# Patient Record
Sex: FEMALE | Race: WHITE | NOT HISPANIC OR LATINO | Employment: FULL TIME | URBAN - METROPOLITAN AREA
[De-identification: names, ages, dates, MRNs, and addresses within clinical notes are randomized per-mention and may not be internally consistent; named-entity substitution may affect disease eponyms.]

---

## 2017-01-12 ENCOUNTER — APPOINTMENT (OUTPATIENT)
Dept: RADIOLOGY | Facility: IMAGING CENTER | Age: 32
End: 2017-01-12
Attending: PHYSICIAN ASSISTANT
Payer: COMMERCIAL

## 2017-01-12 ENCOUNTER — OFFICE VISIT (OUTPATIENT)
Dept: URGENT CARE | Facility: CLINIC | Age: 32
End: 2017-01-12
Payer: COMMERCIAL

## 2017-01-12 VITALS
OXYGEN SATURATION: 99 % | HEART RATE: 74 BPM | TEMPERATURE: 98.4 F | SYSTOLIC BLOOD PRESSURE: 110 MMHG | WEIGHT: 139 LBS | BODY MASS INDEX: 21.82 KG/M2 | HEIGHT: 67 IN | RESPIRATION RATE: 17 BRPM | DIASTOLIC BLOOD PRESSURE: 80 MMHG

## 2017-01-12 DIAGNOSIS — M79.672 LEFT FOOT PAIN: ICD-10-CM

## 2017-01-12 DIAGNOSIS — S93.412A SPRAIN OF CALCANEOFIBULAR LIGAMENT OF LEFT ANKLE, INITIAL ENCOUNTER: ICD-10-CM

## 2017-01-12 DIAGNOSIS — S82.832A OTHER CLOSED FRACTURE OF DISTAL END OF LEFT FIBULA, INITIAL ENCOUNTER: Primary | ICD-10-CM

## 2017-01-12 LAB
INT CON NEG: NORMAL
INT CON POS: NORMAL
POC URINE PREGNANCY TEST: NEGATIVE

## 2017-01-12 PROCEDURE — 73590 X-RAY EXAM OF LOWER LEG: CPT | Mod: TC | Performed by: PHYSICIAN ASSISTANT

## 2017-01-12 PROCEDURE — 99214 OFFICE O/P EST MOD 30 MIN: CPT | Performed by: PHYSICIAN ASSISTANT

## 2017-01-12 PROCEDURE — 73630 X-RAY EXAM OF FOOT: CPT | Mod: TC | Performed by: PHYSICIAN ASSISTANT

## 2017-01-12 PROCEDURE — 81025 URINE PREGNANCY TEST: CPT | Performed by: PHYSICIAN ASSISTANT

## 2017-01-12 PROCEDURE — 73610 X-RAY EXAM OF ANKLE: CPT | Mod: TC | Performed by: PHYSICIAN ASSISTANT

## 2017-01-12 RX ORDER — HYDROCODONE BITARTRATE AND ACETAMINOPHEN 5; 325 MG/1; MG/1
1-2 TABLET ORAL EVERY 4 HOURS PRN
Qty: 30 TAB | Refills: 0 | Status: SHIPPED | OUTPATIENT
Start: 2017-01-12 | End: 2017-01-19

## 2017-01-12 ASSESSMENT — ENCOUNTER SYMPTOMS
INABILITY TO BEAR WEIGHT: 1
TINGLING: 0
LOSS OF SENSATION: 0
MUSCLE WEAKNESS: 0
LOSS OF MOTION: 1
NUMBNESS: 0

## 2017-01-12 NOTE — MR AVS SNAPSHOT
"        Allie Savage Garrick   2017 1:40 PM   Office Visit   MRN: [id number]    Department:  [facility]   Dept Phone:  [phone]    Description:  Female : 1985   Provider:  Claudy Hernández PA-C           Reason for Visit     Ankle Injury working out slipped and rolled left ankle today, painful to apply pressure, hard to rotate, radiating up the leg      Allergies as of 2017     Allergen Noted Reactions    Iodine 2015   Rash    Rash     Mobic [Meloxicam] 2015   Anxiety, Unspecified    Per patient,  meloxican made her very mc[place]mott    Tape 2015   Rash    Paper tape okay      You were diagnosed with     Other closed fracture of distal end of left fibula, initial encounter   [[id number]]  -  Primary     Sprain of calcaneofibular ligament of left ankle, initial encounter   [[id number]]       Left foot pain   [[id number]]         Vital Signs     Blood Pressure Pulse Temperature Respirations Height Weight    110/80 mmHg 74 36.9 °C (98.4 °F) 17 1.702 m (5' 7\") 63.05 kg (139 lb)    Body Mass Index Oxygen Saturation Breastfeeding? Smoking Status          21.77 kg/m2 99% No Never Smoker         Basic Information     Date Of Birth Sex Race Ethnicity Preferred Language    1985 Female White Non- English      Problem List              ICD-10-CM Priority Class Noted - Resolved    Chronic right shoulder pain M25.511, G89.29   2014 - Present    History of abnormal cervical Pap smear Z87.898   2014 - Present    Dysplasia of cervix, low grade (ALLIE 1) N87.0   Unknown - Present    Hemorrhoids K64.9   2015 - Present    Leukocytosis D72.829   2015 - Present    Hyponatremia E87.1   2015 - Present    Normocytic anemia D64.9   2015 - Present    Lower abdominal pain R10.30   2015 - Present    Anal fissure K60.2   10/26/2015 - Present      Health Maintenance        Date Due Completion Dates    IMM DTaP/Tdap/Td Vaccine (1 - Tdap) 2002 ---    IMM INFLUENZA (1) " 9/1/2016 ---    PAP SMEAR 11/7/2019 11/7/2016, 11/7/2016, 10/26/2015, 10/26/2015, 10/21/2014, 10/21/2014, 6/17/2014            Results     POCT Pregnancy      Component Value Standard Range & Units    POC Urine Pregnancy Test NEGATIVE Negative    Internal Control Positive Valid     Internal Control Negative Valid                         Current Immunizations     No immunizations on file.      Below and/or attached are the medications your provider expects you to take. Review all of your home medications and newly ordered medications with your provider and/or pharmacist. Follow medication instructions as directed by your provider and/or pharmacist. Please keep your medication list with you and share with your provider. Update the information when medications are discontinued, doses are changed, or new medications (including over-the-counter products) are added; and carry medication information at all times in the event of emergency situations     Allergies:  IODINE - Rash     MOBIC - Anxiety,Unspecified     TAPE - Rash               Medications  Valid as of: January 12, 2017 -  3:01 PM    Generic Name Brand Name Tablet Size Instructions for use    Ascorbic Acid Buffered   Take  by mouth.        Glutamine (Powder) Glutamine  Take  by mouth.        Hydrocodone-Acetaminophen (Tab) NORCO 5-325 MG Take 1-2 Tabs by mouth every four hours as needed for up to 7 days.        Ibuprofen (Tab) MOTRIN 200 MG Take 800 mg by mouth every 6 hours as needed.        Iron Combinations (Tab) Iron  Take  by mouth.        Naltrexone HCl   Take  by mouth.        Tryptophan (Cap) Tryptophan 500 MG Take  by mouth.        .                 Medicines prescribed today were sent to:     [facility] #[id number] - [place], [place] - [address] [place] [address]    [address] [facility] [address]    Phone: [phone] Fax: [phone]    Open 24 Hours?: No      Medication refill instructions:       If your prescription bottle indicates you have  medication refills left, it is not necessary to call your provider’s office. Please contact your pharmacy and they will refill your medication.    If your prescription bottle indicates you do not have any refills left, you may request refills at any time through one of the following ways: The online [employer] system (except Urgent Care), by calling your provider’s office, or by asking your pharmacy to contact your provider’s office with a refill request. Medication refills are processed only during regular business hours and may not be available until the next business day. Your provider may request additional information or to have a follow-up visit with you prior to refilling your medication.   *Please Note: Medication refills are assigned a new Rx number when refilled electronically. Your pharmacy may indicate that no refills were authorized even though a new prescription for the same medication is available at the pharmacy. Please request the medicine by name with the pharmacy before contacting your provider for a refill.        Your To Do List     Future Labs/Procedures Complete By Expires    DX-ANKLE 3+ VIEWS LEFT  As directed 1/12/2018    DX-FOOT-COMPLETE 3+ LEFT  As directed 1/12/2018    DX-TIBIA AND FIBULA LEFT  As directed 1/12/2018      Referral     A referral request has been sent to our patient care coordination department. Please allow 3-5 business days for us to process this request and contact you either by phone or mail. If you do not hear from us by the 5th business day, please call us at [phone].        Instructions    Fibular Ankle Fracture Treated With or Without Immobilization, Adult  A fibular fracture at your ankle is a break (fracture) bone in the smallest of the two bones in your lower leg, located on the outside of your leg (fibula) close to the area at your ankle joint.  CAUSES  · Rolling your ankle.  · Twisting your ankle.  · Extreme flexing or extending of your foot.  · Severe  force on your ankle as when falling from a distance.  RISK FACTORS  · Jumping activities.  · Participation in sports.  · Osteoporosis.  · Advanced age.  · Previous ankle injuries.  SIGNS AND SYMPTOMS  · Pain.  · Swelling.  · Inability to put weight on injured ankle.  · Bruising.  · Bone deformities at site of injury.  DIAGNOSIS   This fracture is diagnosed with the help of an X-ray exam.  TREATMENT   If the fractured bone did not move out of place it usually will heal without problems and does casting or splinting. If immobilization is needed for comfort or the fractured bone moved out of place and will not heal properly with immobilization, a cast or splint will be used.  HOME CARE INSTRUCTIONS   · Apply ice to the area of injury:  ¨ Put ice in a plastic bag.  ¨ Place a towel between your skin and the bag.  ¨ Leave the ice on for 20 minutes, 2-3 times a day.  · Use crutches as directed. Resume walking without crutches as directed by your health care provider.  · Only take over-the-counter or prescription medicines for pain, discomfort, or fever as directed by your health care provider.  · If you have a removable splint or boot, do not remove the boot unless directed by your health care provider.  SEEK MEDICAL CARE IF:   · You have continued pain or more swelling  · The medications do not control the pain.  SEEK IMMEDIATE MEDICAL CARE IF:  · You develop severe pain in the leg or foot.  · Your skin or nails below the injury turn blue or grey or feel cold or numb.  MAKE SURE YOU:   · Understand these instructions.  · Will watch your condition.  · Will get help right away if you are not doing well or get worse.     This information is not intended to replace advice given to you by your health care provider. Make sure you discuss any questions you have with your health care provider.     Document Released: 12/18/2006 Document Revised: 01/08/2016 Document Reviewed: 07/30/2014  [employer] Interactive Patient Education ©2016  [employer]            [employer] Access Code: Activation code not generated  Current meebee Status: Active

## 2017-01-12 NOTE — PATIENT INSTRUCTIONS
Fibular Ankle Fracture Treated With or Without Immobilization, Adult  A fibular fracture at your ankle is a break (fracture) bone in the smallest of the two bones in your lower leg, located on the outside of your leg (fibula) close to the area at your ankle joint.  CAUSES  · Rolling your ankle.  · Twisting your ankle.  · Extreme flexing or extending of your foot.  · Severe force on your ankle as when falling from a distance.  RISK FACTORS  · Jumping activities.  · Participation in sports.  · Osteoporosis.  · Advanced age.  · Previous ankle injuries.  SIGNS AND SYMPTOMS  · Pain.  · Swelling.  · Inability to put weight on injured ankle.  · Bruising.  · Bone deformities at site of injury.  DIAGNOSIS   This fracture is diagnosed with the help of an X-ray exam.  TREATMENT   If the fractured bone did not move out of place it usually will heal without problems and does casting or splinting. If immobilization is needed for comfort or the fractured bone moved out of place and will not heal properly with immobilization, a cast or splint will be used.  HOME CARE INSTRUCTIONS   · Apply ice to the area of injury:  ¨ Put ice in a plastic bag.  ¨ Place a towel between your skin and the bag.  ¨ Leave the ice on for 20 minutes, 2-3 times a day.  · Use crutches as directed. Resume walking without crutches as directed by your health care provider.  · Only take over-the-counter or prescription medicines for pain, discomfort, or fever as directed by your health care provider.  · If you have a removable splint or boot, do not remove the boot unless directed by your health care provider.  SEEK MEDICAL CARE IF:   · You have continued pain or more swelling  · The medications do not control the pain.  SEEK IMMEDIATE MEDICAL CARE IF:  · You develop severe pain in the leg or foot.  · Your skin or nails below the injury turn blue or grey or feel cold or numb.  MAKE SURE YOU:   · Understand these instructions.  · Will watch your  condition.  · Will get help right away if you are not doing well or get worse.     This information is not intended to replace advice given to you by your health care provider. Make sure you discuss any questions you have with your health care provider.     Document Released: 12/18/2006 Document Revised: 01/08/2016 Document Reviewed: 07/30/2014  [employer] Interactive Patient Education ©2016 [employer] Inc.

## 2017-01-12 NOTE — PROGRESS NOTES
"Subjective:      PT is a 31 y.o. female who presents with Ankle Injury            Ankle Injury   The incident occurred less than 1 hour ago. The incident occurred at the gym. The injury mechanism was an inversion injury. The pain is present in the left ankle. The quality of the pain is described as cramping, shooting and stabbing. The pain is at a severity of 8/10. The pain is severe. The pain has been constant since onset. Associated symptoms include an inability to bear weight and a loss of motion. Pertinent negatives include no loss of sensation, muscle weakness, numbness or tingling. She reports no foreign bodies present. The symptoms are aggravated by palpation, weight bearing and movement. She has tried ice, elevation, rest, non-weight bearing and NSAIDs for the symptoms. The treatment provided no relief.   Pt states she was at the gym an hour ago and was in a class where they jump from the ground to the top of a ball and when she landed after one jump rolled her left ankle with immediate pain and inability to bear weight. Pt has not taken any Rx medications for this condition. Pt states the pain is a 8-9/10, aching in nature and worse with flexion and since the injury occurred. Pt denies CP, SOB, NVD, paresthesias, headaches, dizziness, change in vision, hives, or other joint pain. The pt's medication list, problem list, and allergies have been evaluated and reviewed during today's visit.    PMH:  Past Medical History   Diagnosis Date   • Chronic shoulder pain 6/5/2014   • Dysplasia of cervix, low grade (ALLIE 1)    • Other specified disorder of intestines      constipation, diarrhea   • Breath shortness      pt reports past problem, no sob currently 9/4/15   • Psychiatric problem      depression   • Unspecified hemorrhagic conditions (HCC)      easy bruising, nosebleeds   • Anesthesia      \"possible aversion to local anesthesia\"       PSH:  Past Surgical History   Procedure Laterality Date   • Other orthopedic " surgery  2015     right shoulder   • Hemorrhoidectomy  2015     Procedure: HEMORRHOIDECTOMY;  Surgeon: Timo Miranda M.D.;  Location: SURGERY [facility];  Service:    • Lumpectomy Left      needle bx of lump- benign       Fam Hx:    family history includes Cancer in her father, paternal aunt, and paternal grandmother.  Family Status   Relation Status Death Age   • Father     • Paternal Grandmother     • Paternal Aunt Alive    • Maternal Grandmother     • Mother Alive    • Sister Alive    • Brother Alive        Soc HX:  Social History     Social History   • Marital Status: Single     Spouse Name: N/A   • Number of Children: N/A   • Years of Education: N/A     Occupational History   • Not on file.     Social History Main Topics   • Smoking status: Never Smoker    • Smokeless tobacco: Never Used   • Alcohol Use: 0.5 oz/week     1 Glasses of wine per week      Comment: 1-2/week   • Drug Use: No   • Sexual Activity:     Partners: Male     Other Topics Concern   • Not on file     Social History Narrative         Medications:    Current outpatient prescriptions:   •  hydrocodone-acetaminophen (NORCO) 5-325 MG Tab per tablet, Take 1-2 Tabs by mouth every four hours as needed for up to 7 days., Disp: 30 Tab, Rfl: 0  •  Iron Tab, Take  by mouth., Disp: , Rfl:   •  ibuprofen (MOTRIN) 200 MG Tab, Take 800 mg by mouth every 6 hours as needed., Disp: , Rfl:   •  NALTREXONE HCL PO, Take  by mouth., Disp: , Rfl:   •  Tryptophan 500 MG Cap, Take  by mouth., Disp: , Rfl:   •  Glutamine Powder, Take  by mouth., Disp: , Rfl:   •  Ascorbic Acid Buffered (BUFFERED VITAMIN C PO), Take  by mouth., Disp: , Rfl:       Allergies:  Iodine; Mobic; and Tape        Review of Systems   Neurological: Negative for tingling and numbness.   Constitutional: Negative for fever, chills and malaise/fatigue.   HENT: Negative for congestion and sore throat.    Eyes: Negative for blurred vision, double vision and  "photophobia.   Respiratory: Negative for cough and shortness of breath.    Cardiovascular: Negative for chest pain and palpitations.   Gastrointestinal: Negative for heartburn, nausea, vomiting, abdominal pain, diarrhea and constipation.   Genitourinary: Negative for dysuria and flank pain.   Musculoskeletal: POS for left ankle joint pain and myalgias.   Skin: Negative for itching and rash.   Neurological: Negative for dizziness, tingling and headaches.   Endo/Heme/Allergies: Does not bruise/bleed easily.   Psychiatric/Behavioral: Negative for depression. The patient is not nervous/anxious.             Objective:     /80 mmHg  Pulse 74  Temp(Src) 36.9 °C (98.4 °F)  Resp 17  Ht 1.702 m (5' 7\")  Wt 63.05 kg (139 lb)  BMI 21.77 kg/m2  SpO2 99%  Breastfeeding? No     Physical Exam   Musculoskeletal:        Left ankle: She exhibits decreased range of motion, swelling and ecchymosis. She exhibits no deformity, no laceration and normal pulse. Tenderness. Lateral malleolus, AITFL, CF ligament, posterior TFL and proximal fibula tenderness found. No head of 5th metatarsal tenderness found. Achilles tendon normal.        Legs:       Feet:          Constitutional: PT is oriented to person, place, and time. PT appears well-developed and well-nourished. No distress.   HENT:   Head: Normocephalic and atraumatic.   Mouth/Throat: Oropharynx is clear and moist. No oropharyngeal exudate.   Eyes: Conjunctivae normal and EOM are normal. Pupils are equal, round, and reactive to light.   Neck: Normal range of motion. Neck supple. No thyromegaly present.   Cardiovascular: Normal rate, regular rhythm, normal heart sounds and intact distal pulses.  Exam reveals no gallop and no friction rub.    No murmur heard.  Pulmonary/Chest: Effort normal and breath sounds normal. No respiratory distress. PT has no wheezes. PT has no rales. Pt exhibits no tenderness.   Abdominal: Soft. Bowel sounds are normal. PT exhibits no distension and " no mass. There is no tenderness. There is no rebound and no guarding.   Neurological: PT is alert and oriented to person, place, and time. PT has normal reflexes. No cranial nerve deficit.   Skin: Skin is warm and dry. No rash noted. PT is not diaphoretic. No erythema.       Psychiatric: PT has a normal mood and affect. PT behavior is normal. Judgment and thought content normal.     RADS:                    Narrative        1/12/2017 2:29 PM    HISTORY/REASON FOR EXAM:  Pain/Deformity Following Trauma  Left lower leg pain following injury    TECHNIQUE/EXAM DESCRIPTION AND NUMBER OF VIEWS:  2 views of the LEFT tibia and fibula.    COMPARISON:  None    FINDINGS:  There is a minimally displaced fracture of the distal left fibula, inferior to the ankle mortise. Soft tissue swelling overlies the fracture site. The knee joint appears intact.       Impression        Minimally displaced fracture of the distal left fibula.            Reading Provider Reading Date     Tawny Martinez M.D. Jan 12, 2017            Signing Provider Signing Date Signing Time     Tawny Martinez M.D. Jan 12, 2017  2:34 PM   Narrative        1/12/2017 2:26 PM    HISTORY/REASON FOR EXAM:  Pain/Deformity Following Trauma  Left ankle pain following injury    TECHNIQUE/EXAM DESCRIPTION AND NUMBER OF VIEWS:  1 views of the LEFT ankle.    COMPARISON: None    FINDINGS:    There is a minimally displaced fracture of the distal left fibula. The ankle mortise appears grossly intact on this single image.  No osteochondral lesion is identified.  Soft tissue swelling overlies the fracture site.       Impression          Minimally displaced fracture of the distal left fibula.            Reading Provider Reading Date     Tawny Martinez M.D. Jan 12, 2017            Signing Provider Signing Date Signing Time     Tawny Martinez M.D. Jan 12, 2017  2:35 PM   Narrative        1/12/2017 2:28 PM    HISTORY/REASON FOR EXAM:  Pain/Deformity Following  Trauma  Left foot pain following injury    TECHNIQUE/EXAM DESCRIPTION AND NUMBER OF VIEWS:  3 nonweightbearing views of the LEFT foot.    COMPARISON:  None    FINDINGS:  No acute fracture, malalignment, or soft tissue abnormality.       Impression        No acute fractures identified.            Reading Provider Reading Date     Tawny Martinez M.D. Jan 12, 2017            Signing Provider Signing Date Signing Time     Tawny Martinez M.D. Jan 12, 2017  2:31 PM          Assessment/Plan:     1. Other closed fracture of distal end of left fibula, initial encounter    - REFERRAL TO ORTHOPEDICS  - hydrocodone-acetaminophen (NORCO) 5-325 MG Tab per tablet; Take 1-2 Tabs by mouth every four hours as needed for up to 7 days.  Dispense: 30 Tab; Refill: 0    2. Sprain of calcaneofibular ligament of left ankle, initial encounter    - DX-ANKLE 3+ VIEWS LEFT; Future  - DX-TIBIA AND FIBULA LEFT; Future  - POCT Pregnancy  - REFERRAL TO ORTHOPEDICS  - hydrocodone-acetaminophen (NORCO) 5-325 MG Tab per tablet; Take 1-2 Tabs by mouth every four hours as needed for up to 7 days.  Dispense: 30 Tab; Refill: 0    3. Left foot pain    - DX-FOOT-COMPLETE 3+ LEFT; Future  - POCT Pregnancy  - REFERRAL TO ORTHOPEDICS  - hydrocodone-acetaminophen (NORCO) 5-325 MG Tab per tablet; Take 1-2 Tabs by mouth every four hours as needed for up to 7 days.  Dispense: 30 Tab; Refill: 0      [place]  Aware web site evaluation: I have obtained and reviewed patient utilization report from [facility] pharmacy database prior to writing prescription for controlled substance II, III or IV per [place] bill . Based on the report and my clinical assessment the prescription is medically necessary.   NSAIDs for pain 1-5, Norco for pain 6-10 or to help get to sleep.  Pt would like to see Dr. Ruano at the [facility]  Ortho referral made  RICE therapy discussed  Gentle ROM exercises discussed  WBAT LLE  Walker boot to LLE  Crutches for ambulation  Ice/heat  therapy discussed  Rest, fluids encouraged.  AVS with medical info given.  Pt was in full understanding and agreement with the plan.  Follow-up as needed if symptoms worsen or fail to improve.

## 2017-02-02 ENCOUNTER — OFFICE VISIT (OUTPATIENT)
Dept: URGENT CARE | Facility: CLINIC | Age: 32
End: 2017-02-02
Payer: COMMERCIAL

## 2017-02-02 VITALS
HEIGHT: 67 IN | OXYGEN SATURATION: 98 % | HEART RATE: 73 BPM | RESPIRATION RATE: 16 BRPM | WEIGHT: 139 LBS | SYSTOLIC BLOOD PRESSURE: 112 MMHG | TEMPERATURE: 98.3 F | BODY MASS INDEX: 21.82 KG/M2 | DIASTOLIC BLOOD PRESSURE: 80 MMHG

## 2017-02-02 DIAGNOSIS — R21 SKIN ERUPTION: ICD-10-CM

## 2017-02-02 PROCEDURE — 99213 OFFICE O/P EST LOW 20 MIN: CPT | Performed by: FAMILY MEDICINE

## 2017-02-02 RX ORDER — PREDNISONE 10 MG/1
30 TABLET ORAL EVERY MORNING
Qty: 21 TAB | Refills: 0 | Status: SHIPPED | OUTPATIENT
Start: 2017-02-02 | End: 2017-02-09

## 2017-02-02 RX ORDER — ACETAMINOPHEN 325 MG/1
650 TABLET ORAL EVERY 4 HOURS PRN
COMMUNITY

## 2017-02-02 RX ORDER — TRIAMCINOLONE ACETONIDE 1 MG/G
CREAM TOPICAL
Qty: 454 G | Refills: 0 | Status: SHIPPED | OUTPATIENT
Start: 2017-02-02

## 2017-02-02 ASSESSMENT — ENCOUNTER SYMPTOMS
FOCAL WEAKNESS: 0
CHILLS: 0
DIZZINESS: 0
FEVER: 0

## 2017-02-02 NOTE — PROGRESS NOTES
"Subjective:      Allie Mccauley is a 31 y.o. female who presents with Rash    Chief Complaint   Patient presents with   • Rash     throught the body, itchi, bumps, x6days        - itchy rash on/off x 1 week. Comes up in one area then goes away and pops up somewhere else. Red raised rash. Red Rash not really present at moment. Nothing new she can think of to cause outbreak               HPI    Review of Systems   Constitutional: Negative for fever and chills.   Skin: Positive for rash.   Neurological: Negative for dizziness and focal weakness.          Objective:     /80 mmHg  Pulse 73  Temp(Src) 36.8 °C (98.3 °F)  Resp 16  Ht 1.702 m (5' 7.01\")  Wt 63.05 kg (139 lb)  BMI 21.77 kg/m2  SpO2 98%     Physical Exam   Constitutional: She appears well-developed. No distress.   HENT:   Head: Normocephalic and atraumatic.   Cardiovascular: Regular rhythm.    No murmur heard.  Neurological: She is alert.   Skin: Skin is warm and dry.   Psychiatric: She has a normal mood and affect. Judgment normal.   Nursing note and vitals reviewed.  Skin seen over abd/backarms is dry and has a few small excoriated areas of skin. A few papules.              Assessment/Plan:         1. Skin eruption  predniSONE (DELTASONE) 10 MG Tab    triamcinolone acetonide (KENALOG) 0.1 % Cream    REFERRAL TO DERMATOLOGY         May be hives/dry skin. Trial of above, if not improving will need derm f/u     Dx & d/c instructions discussed w/ patient and/or family members. Follow up w/ Prvt Dr or here in 3-4 days if not getting better, sooner if needed,  ER if worse and UC/PCP unavailable.        Possible side effects (i.e. Rash, GI upset/constipation, sedation, elevation of BP or sugars) of any medications given discussed.                    "

## 2017-02-02 NOTE — MR AVS SNAPSHOT
"        Allie Savage Garrick   2017 12:15 PM   Office Visit   MRN: [id number]    Department:  [facility]   Dept Phone:  [phone]    Description:  Female : 1985   Provider:  Kuldip Godinez M.D.           Reason for Visit     Rash throught the body, itchi, bumps, x6days      Allergies as of 2017     Allergen Noted Reactions    Iodine 2015   Rash    Rash     Mobic [Meloxicam] 2015   Anxiety, Unspecified    Per patient,  meloxican made her very mcdermott    Tape 2015   Rash    Paper tape okay      You were diagnosed with     Skin eruption   [[id number]]         Vital Signs     Blood Pressure Pulse Temperature Respirations Height Weight    112/80 mmHg 73 36.8 °C (98.3 °F) 16 1.702 m (5' 7.01\") 63.05 kg (139 lb)    Body Mass Index Oxygen Saturation Smoking Status             21.77 kg/m2 98% Never Smoker          Basic Information     Date Of Birth Sex Race Ethnicity Preferred Language    1985 Female White Non- English      Problem List              ICD-10-CM Priority Class Noted - Resolved    Chronic right shoulder pain M25.511, G89.29   2014 - Present    History of abnormal cervical Pap smear Z87.898   2014 - Present    Dysplasia of cervix, low grade (ALLIE 1) N87.0   Unknown - Present    Hemorrhoids K64.9   2015 - Present    Leukocytosis D72.829   2015 - Present    Hyponatremia E87.1   2015 - Present    Normocytic anemia D64.9   2015 - Present    Lower abdominal pain R10.30   2015 - Present    Anal fissure K60.2   10/26/2015 - Present      Health Maintenance        Date Due Completion Dates    IMM DTaP/Tdap/Td Vaccine (1 - Tdap) 2002 ---    IMM INFLUENZA (1) 2016 ---    PAP SMEAR 2019, 2016, 10/26/2015, 10/26/2015, 10/21/2014, 10/21/2014, 2014            Current Immunizations     No immunizations on file.      Below and/or attached are the medications your provider expects you to take. Review all of your " home medications and newly ordered medications with your provider and/or pharmacist. Follow medication instructions as directed by your provider and/or pharmacist. Please keep your medication list with you and share with your provider. Update the information when medications are discontinued, doses are changed, or new medications (including over-the-counter products) are added; and carry medication information at all times in the event of emergency situations     Allergies:  IODINE - Rash     MOBIC - Anxiety,Unspecified     TAPE - Rash               Medications  Valid as of: February 02, 2017 - 12:54 PM    Generic Name Brand Name Tablet Size Instructions for use    Acetaminophen (Tab) TYLENOL 325 MG Take 650 mg by mouth every four hours as needed.        Ascorbic Acid Buffered   Take  by mouth.        Glutamine (Powder) Glutamine  Take  by mouth.        Ibuprofen (Tab) MOTRIN 200 MG Take 800 mg by mouth every 6 hours as needed.        Iron Combinations (Tab) Iron  Take  by mouth.        Naltrexone HCl   Take  by mouth.        PredniSONE (Tab) DELTASONE 10 MG Take 3 Tabs by mouth every morning for 7 days.        Triamcinolone Acetonide (Cream) KENALOG 0.1 % Apply TID to itchy areas x 1 week        Tryptophan (Cap) Tryptophan 500 MG Take  by mouth.        .                 Medicines prescribed today were sent to:     [facility] #[id number] - [place], [place] - [address]    [address] [facility] [address]    Phone: [phone] Fax: [phone]    Open 24 Hours?: No      Medication refill instructions:       If your prescription bottle indicates you have medication refills left, it is not necessary to call your provider’s office. Please contact your pharmacy and they will refill your medication.    If your prescription bottle indicates you do not have any refills left, you may request refills at any time through one of the following ways: The online [employer] system (except Urgent Care), by calling your  provider’s office, or by asking your pharmacy to contact your provider’s office with a refill request. Medication refills are processed only during regular business hours and may not be available until the next business day. Your provider may request additional information or to have a follow-up visit with you prior to refilling your medication.   *Please Note: Medication refills are assigned a new Rx number when refilled electronically. Your pharmacy may indicate that no refills were authorized even though a new prescription for the same medication is available at the pharmacy. Please request the medicine by name with the pharmacy before contacting your provider for a refill.        Referral     A referral request has been sent to our patient care coordination department. Please allow 3-5 business days for us to process this request and contact you either by phone or mail. If you do not hear from us by the 5th business day, please call us at [phone].           [employer] Access Code: Activation code not generated  Current [employer] Status: Active

## 2017-03-27 ENCOUNTER — OFFICE VISIT (OUTPATIENT)
Dept: MEDICAL GROUP | Facility: MEDICAL CENTER | Age: 32
End: 2017-03-27
Payer: COMMERCIAL

## 2017-03-27 VITALS
OXYGEN SATURATION: 99 % | RESPIRATION RATE: 14 BRPM | WEIGHT: 144.4 LBS | TEMPERATURE: 98.6 F | HEIGHT: 66 IN | HEART RATE: 84 BPM | SYSTOLIC BLOOD PRESSURE: 122 MMHG | BODY MASS INDEX: 23.21 KG/M2 | DIASTOLIC BLOOD PRESSURE: 68 MMHG

## 2017-03-27 DIAGNOSIS — Z34.90 PREGNANCY, UNSPECIFIED GESTATIONAL AGE: ICD-10-CM

## 2017-03-27 PROCEDURE — 99214 OFFICE O/P EST MOD 30 MIN: CPT | Performed by: PHYSICIAN ASSISTANT

## 2017-03-27 ASSESSMENT — PAIN SCALES - GENERAL: PAINLEVEL: 2=MINIMAL-SLIGHT

## 2017-03-27 NOTE — MR AVS SNAPSHOT
"        Allie Savage Weeks   3/27/2017 7:00 AM   Office Visit   MRN: [id number]    Department:  [place] [address]   Dept Phone:  [phone]    Description:  Female : 1985   Provider:  Sanjana Estrada PA-C           Reason for Visit     Labs Only review outside labs       Allergies as of 3/27/2017     Allergen Noted Reactions    Iodine 2015   Rash    Rash     Mobic [Meloxicam] 2015   Anxiety, Unspecified    Per patient,  meloxican made her very mcdermott    Tape 2015   Rash    Paper tape okay      Vital Signs     Blood Pressure Pulse Temperature Respirations Height Weight    122/68 mmHg 84 37 °C (98.6 °F) 14 1.676 m (5' 5.98\") 65.5 kg (144 lb 6.4 oz)    Body Mass Index Oxygen Saturation Last Menstrual Period Breastfeeding? Smoking Status       23.32 kg/m2 99% 2017 No Never Smoker        Basic Information     Date Of Birth Sex Race Ethnicity Preferred Language    1985 Female White Non- English      Problem List              ICD-10-CM Priority Class Noted - Resolved    Chronic right shoulder pain M25.511, G89.29   2014 - Present    History of abnormal cervical Pap smear Z87.898   2014 - Present    Dysplasia of cervix, low grade (ALLIE 1) N87.0   Unknown - Present    Hemorrhoids K64.9   2015 - Present    Leukocytosis D72.829   2015 - Present    Hyponatremia E87.1   2015 - Present    Normocytic anemia D64.9   2015 - Present    Lower abdominal pain R10.30   2015 - Present    Anal fissure K60.2   10/26/2015 - Present      Health Maintenance        Date Due Completion Dates    IMM DTaP/Tdap/Td Vaccine (1 - Tdap) 2002 ---    IMM INFLUENZA (1) 2016 ---    PAP SMEAR 2019, 2016, 10/26/2015, 10/26/2015, 10/21/2014, 10/21/2014, 2014            Current Immunizations     No immunizations on file.      Below and/or attached are the medications your provider expects you to take. Review all of your home medications and newly " ordered medications with your provider and/or pharmacist. Follow medication instructions as directed by your provider and/or pharmacist. Please keep your medication list with you and share with your provider. Update the information when medications are discontinued, doses are changed, or new medications (including over-the-counter products) are added; and carry medication information at all times in the event of emergency situations     Allergies:  IODINE - Rash     MOBIC - Anxiety,Unspecified     TAPE - Rash               Medications  Valid as of: March 27, 2017 -  8:57 AM    Generic Name Brand Name Tablet Size Instructions for use    Acetaminophen (Tab) TYLENOL 325 MG Take 650 mg by mouth every four hours as needed.        Ascorbic Acid Buffered   Take  by mouth.        Glutamine (Powder) Glutamine  Take  by mouth.        Ibuprofen (Tab) MOTRIN 200 MG Take 800 mg by mouth every 6 hours as needed.        Iron Combinations (Tab) Iron  Take  by mouth.        Naltrexone HCl   Take  by mouth.        Triamcinolone Acetonide (Cream) KENALOG 0.1 % Apply TID to itchy areas x 1 week        Tryptophan (Cap) Tryptophan 500 MG Take  by mouth.        .                 Medicines prescribed today were sent to:     [facility] #[id number] [place], [place] - [address] [place] [id number]    Phone: [phone] Fax: [phone]    Open 24 Hours?: No      Medication refill instructions:       If your prescription bottle indicates you have medication refills left, it is not necessary to call your provider’s office. Please contact your pharmacy and they will refill your medication.    If your prescription bottle indicates you do not have any refills left, you may request refills at any time through one of the following ways: The online [employer] system (except Urgent Care), by calling your provider’s office, or by asking your pharmacy to contact your provider’s office with a refill request. Medication  refills are processed only during regular business hours and may not be available until the next business day. Your provider may request additional information or to have a follow-up visit with you prior to refilling your medication.   *Please Note: Medication refills are assigned a new Rx number when refilled electronically. Your pharmacy may indicate that no refills were authorized even though a new prescription for the same medication is available at the pharmacy. Please request the medicine by name with the pharmacy before contacting your provider for a refill.           [employer] Access Code: Activation code not generated  Current [employer] Status: Active

## 2024-08-08 NOTE — PROGRESS NOTES
Subjective:     Chief Complaint   Patient presents with   • Labs Only     review outside labs      Allie Mccauley is a 31 y.o. female here today for + hcg and was also testing her hcg/progesterone levels as listed below    LMP 1/27/17  Had positive home pregnancy and then also sees [facility] who did labs. Her hCG continues to climb although her progesterone decline slightly. She was very concerned about this.  She also had 2 readings for her CBC- both with normal WBCs, one with slightly elevated absolute lymphocytes at 7.3. She was also having a cold during this time.  Overall she has been nauseous most days, treating with Ginger, B-6, eating throughout the day, stopped taking her prenatal vitamins since they were causing nausea.  She has also been fatigued she is trying to rest when she can and drinking plenty of fluids.  Denies any headache, lightheadedness, chest pain, palpitations, abdominal pain, vaginal bleeding, change in urination or urinary symptoms.  She has an appointment with OBGYN in a few weeks.    Current medicines (including changes today)  Current Outpatient Prescriptions   Medication Sig Dispense Refill   • acetaminophen (TYLENOL) 325 MG Tab Take 650 mg by mouth every four hours as needed.     • triamcinolone acetonide (KENALOG) 0.1 % Cream Apply TID to itchy areas x 1 week 454 g 0   • NALTREXONE HCL PO Take  by mouth.     • Tryptophan 500 MG Cap Take  by mouth.     • Iron Tab Take  by mouth.     • Glutamine Powder Take  by mouth.     • Ascorbic Acid Buffered (BUFFERED VITAMIN C PO) Take  by mouth.     • ibuprofen (MOTRIN) 200 MG Tab Take 800 mg by mouth every 6 hours as needed.       No current facility-administered medications for this visit.     She  has a past medical history of Chronic shoulder pain (6/5/2014); Dysplasia of cervix, low grade (ALLIE 1); Other specified disorder of intestines; Breath shortness; Psychiatric problem; Unspecified hemorrhagic conditions (CMS-HCC); and  "Anesthesia.    ROS   No chest pain, no shortness of breath, no abdominal pain       Objective:     Blood pressure 122/68, pulse 84, temperature 37 °C (98.6 °F), resp. rate 14, height 1.676 m (5' 5.98\"), weight 65.5 kg (144 lb 6.4 oz), last menstrual period 01/27/2017, SpO2 99 %, not currently breastfeeding. Body mass index is 23.32 kg/(m^2).   Physical Exam:  Alert, oriented in no acute distress.  Eye contact is good, speech goal directed, affect calm  HEENT: conjunctiva non-injected, sclera non-icteric, PERRL.  Neck No adenopathy or masses in the neck or supraclavicular regions.  Lungs: clear to auscultation bilaterally with good excursion.  CV: regular rate and rhythm.  Abdomen: soft, nontender, no HSM, No CVAT  Ext: no edema, color normal, peripheral pulses 2+, temperature normal      Assessment and Plan:   The following treatment plan was discussed     1. Pregnancy, unspecified gestational age  Positive hCG poc.   Also lab work shows that she is positive.  Over the increase of hCG is a positive thing that means that his presenting is healthy.   Since she did have a cold during the time it would be typical that part of her white blood cells might be increased. This is something that will be rechecked again and he weighs during her pregnancy.  Continue with the antinausea regimen although also recommend restarting prenatal vitamins, discussed the importance of them and taking some that have DHA in them. Pt stated understanding.  Keep appointment with OB- who is in [place] since she is moving.     Greater than 50% of 25 minutes was spent in face-to-face care and coordination regarding pregnancy     Followup: Return if symptoms worsen or fail to improve.           Please note that this dictation was created using voice recognition software. I have made every reasonable attempt to correct obvious errors, but I expect that there are errors of grammar and possibly content that I did not discover before finalizing the " note.   spouse with covid and pt  c/o headache and body aches cough and sore throat